# Patient Record
Sex: MALE | Race: WHITE | NOT HISPANIC OR LATINO | URBAN - METROPOLITAN AREA
[De-identification: names, ages, dates, MRNs, and addresses within clinical notes are randomized per-mention and may not be internally consistent; named-entity substitution may affect disease eponyms.]

---

## 2022-05-22 ENCOUNTER — EMERGENCY (EMERGENCY)
Facility: HOSPITAL | Age: 32
LOS: 0 days | Discharge: HOME | End: 2022-05-22
Attending: EMERGENCY MEDICINE
Payer: COMMERCIAL

## 2022-05-22 VITALS
RESPIRATION RATE: 18 BRPM | HEART RATE: 82 BPM | OXYGEN SATURATION: 98 % | DIASTOLIC BLOOD PRESSURE: 71 MMHG | SYSTOLIC BLOOD PRESSURE: 118 MMHG | TEMPERATURE: 98 F

## 2022-05-22 VITALS
OXYGEN SATURATION: 99 % | HEART RATE: 110 BPM | HEIGHT: 68 IN | TEMPERATURE: 98 F | WEIGHT: 160.06 LBS | DIASTOLIC BLOOD PRESSURE: 76 MMHG | SYSTOLIC BLOOD PRESSURE: 124 MMHG | RESPIRATION RATE: 16 BRPM

## 2022-05-22 DIAGNOSIS — R51.9 HEADACHE, UNSPECIFIED: ICD-10-CM

## 2022-05-22 DIAGNOSIS — T67.5XXA HEAT EXHAUSTION, UNSPECIFIED, INITIAL ENCOUNTER: ICD-10-CM

## 2022-05-22 DIAGNOSIS — R42 DIZZINESS AND GIDDINESS: ICD-10-CM

## 2022-05-22 DIAGNOSIS — Z88.8 ALLERGY STATUS TO OTHER DRUGS, MEDICAMENTS AND BIOLOGICAL SUBSTANCES: ICD-10-CM
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
## 2022-05-22 DIAGNOSIS — M54.9 DORSALGIA, UNSPECIFIED: ICD-10-CM

## 2022-05-22 LAB
APPEARANCE UR: CLEAR — SIGNIFICANT CHANGE UP
BILIRUB UR-MCNC: NEGATIVE — SIGNIFICANT CHANGE UP
COLOR SPEC: YELLOW — SIGNIFICANT CHANGE UP
DIFF PNL FLD: NEGATIVE — SIGNIFICANT CHANGE UP
GLUCOSE UR QL: NEGATIVE MG/DL — SIGNIFICANT CHANGE UP
KETONES UR-MCNC: NEGATIVE — SIGNIFICANT CHANGE UP
LEUKOCYTE ESTERASE UR-ACNC: NEGATIVE — SIGNIFICANT CHANGE UP
NITRITE UR-MCNC: NEGATIVE — SIGNIFICANT CHANGE UP
PH UR: 6 — SIGNIFICANT CHANGE UP (ref 5–8)
PROT UR-MCNC: NEGATIVE MG/DL — SIGNIFICANT CHANGE UP
SP GR SPEC: 1.01 — SIGNIFICANT CHANGE UP (ref 1.01–1.02)
UROBILINOGEN FLD QL: NEGATIVE MG/DL — SIGNIFICANT CHANGE UP

## 2022-05-22 PROCEDURE — 99284 EMERGENCY DEPT VISIT MOD MDM: CPT

## 2022-05-22 NOTE — ED PROVIDER NOTE - PATIENT PORTAL LINK FT
You can access the FollowMyHealth Patient Portal offered by [facility] by registering at the following website: [email or web address]. By joining [employer]’s FollowMyHealth portal, you will also be able to view your health information using other applications (apps) compatible with our system.

## 2022-05-22 NOTE — ED PROVIDER NOTE - SKIN, MLM
Skin normal color for race, warm, dry and intact. Has 2 small red lesions along the left flank and one the crosses the midline on the right flank, no vesicles.

## 2022-05-22 NOTE — ED ADULT TRIAGE NOTE - CHIEF COMPLAINT QUOTE
pt c/o headache low grade fever, bloating and constipation. Pt took ducolax and advil with some relief. Pt thinks may be heat related, because he feels better in the airconditioning

## 2022-05-22 NOTE — ED PROVIDER NOTE - OBJECTIVE STATEMENT
Pt is a 31 year old male with significant PMH who presents to the ED today for lightheadedness when he was in his hot apartment. There was no air conditioning as it was hot and humid, and he had a slight headache which has now resolved along with his achiness and fatigue since he has been in the air conditioned ED. He has been having some left back pain separate from his feeling of weakness and has an insect bite at the site. It is both itchy and slightly painful when he moves or bends down. No bowel or bladder incontinence, no saddle anesthesia.  no trauma no hematuria. No hx of shingles. Pain does not radiate to the legs. Pt states he had constipation and abdominal discomfort for the past few days, headaches, dizziness, fever. Pt is a 31 year old male with significant PMH who presents to the ED today for lightheadedness when he was in his hot apartment. There was no air conditioning as it was hot and humid, and he had a slight headache which has now resolved along with his achiness and fatigue since he has been in the air conditioned ED. He has been having some left back pain separate from his feeling of weakness and has an insect bite at the site. It is both itchy and slightly painful when he moves or bends down. No bowel or bladder incontinence, no saddle anesthesia.  no trauma no hematuria. No hx of shingles. Pain does not radiate to the legs. Pt states he had constipation and abdominal discomfort for the past few days, headaches, dizziness, fever. Denies any abdominal pain now. Has had bowel movement after taking laxative.

## 2022-05-22 NOTE — ED ADULT NURSE NOTE - OBJECTIVE STATEMENT
Patient reports Lower Left flank pain for 5-6 days. Patient reports "bloating." Patient reports constipation x 7 days: used dulcolax with a BM. Patient reports Headache since last night: Frontal, Patient reports "feeling hot, weak dizzy." Patient relates Apartment on second floor and temperature of room almost 90 degrees, no air conditioning. Advil relieved headache. Dizziness and fatigue remain. Patient reports feeling better in ED "It is so much cooler." No vomiting reports "slight nausea earlier."

## 2022-05-22 NOTE — ED PROVIDER NOTE - MUSCULOSKELETAL, MLM
Spine appears normal, range of motion is not limited, no muscle or joint tenderness, no midline neck or back pain.

## 2022-05-22 NOTE — ED ADULT TRIAGE NOTE - PATIENT ON (OXYGEN DELIVERY METHOD)
room air Mohs Histo Method Verbiage: Each section was then chromacoded and processed in the Mohs lab using the Mohs protocol and submitted for frozen section.

## 2022-05-22 NOTE — ED PROVIDER NOTE - CLINICAL SUMMARY MEDICAL DECISION MAKING FREE TEXT BOX
DDx: heat exhaustion, pain from insect bite given b/l natural and no pain at rest, less likely shingle.  Plan: Pt decline blood work, will get UA, pt decline pain medication. DDx: heat exhaustion, pain from insect bite, unlikely shingles given bilateral and no pain at rest, less likely shingle.  Plan: Pt decline blood work, will get UA, pt decline pain medication.

## 2022-05-23 LAB
CULTURE RESULTS: SIGNIFICANT CHANGE UP
SPECIMEN SOURCE: SIGNIFICANT CHANGE UP
